# Patient Record
Sex: FEMALE | Race: WHITE | URBAN - METROPOLITAN AREA
[De-identification: names, ages, dates, MRNs, and addresses within clinical notes are randomized per-mention and may not be internally consistent; named-entity substitution may affect disease eponyms.]

---

## 2018-04-21 ENCOUNTER — INPATIENT (INPATIENT)
Facility: HOSPITAL | Age: 28
LOS: 1 days | Discharge: LEFT BEFORE DISCHARGE | End: 2018-04-23
Attending: INTERNAL MEDICINE | Admitting: INTERNAL MEDICINE
Payer: MEDICAID

## 2018-04-21 VITALS
TEMPERATURE: 99 F | HEIGHT: 64 IN | OXYGEN SATURATION: 100 % | HEART RATE: 114 BPM | WEIGHT: 125 LBS | DIASTOLIC BLOOD PRESSURE: 94 MMHG | SYSTOLIC BLOOD PRESSURE: 123 MMHG | RESPIRATION RATE: 18 BRPM

## 2018-04-21 DIAGNOSIS — F19.10 OTHER PSYCHOACTIVE SUBSTANCE ABUSE, UNCOMPLICATED: ICD-10-CM

## 2018-04-21 DIAGNOSIS — L03.114 CELLULITIS OF LEFT UPPER LIMB: ICD-10-CM

## 2018-04-21 DIAGNOSIS — Z29.9 ENCOUNTER FOR PROPHYLACTIC MEASURES, UNSPECIFIED: ICD-10-CM

## 2018-04-21 DIAGNOSIS — L03.113 CELLULITIS OF RIGHT UPPER LIMB: ICD-10-CM

## 2018-04-21 DIAGNOSIS — R01.1 CARDIAC MURMUR, UNSPECIFIED: ICD-10-CM

## 2018-04-21 LAB
ALBUMIN SERPL ELPH-MCNC: 3.1 G/DL — LOW (ref 3.3–5)
ALP SERPL-CCNC: 60 U/L — SIGNIFICANT CHANGE UP (ref 40–120)
ALT FLD-CCNC: 15 U/L — SIGNIFICANT CHANGE UP (ref 12–78)
ANION GAP SERPL CALC-SCNC: 9 MMOL/L — SIGNIFICANT CHANGE UP (ref 5–17)
AST SERPL-CCNC: 11 U/L — LOW (ref 15–37)
BASOPHILS # BLD AUTO: 0.02 K/UL — SIGNIFICANT CHANGE UP (ref 0–0.2)
BASOPHILS NFR BLD AUTO: 0.2 % — SIGNIFICANT CHANGE UP (ref 0–2)
BILIRUB SERPL-MCNC: 0.7 MG/DL — SIGNIFICANT CHANGE UP (ref 0.2–1.2)
BUN SERPL-MCNC: 10 MG/DL — SIGNIFICANT CHANGE UP (ref 7–23)
CALCIUM SERPL-MCNC: 8.6 MG/DL — SIGNIFICANT CHANGE UP (ref 8.5–10.1)
CHLORIDE SERPL-SCNC: 106 MMOL/L — SIGNIFICANT CHANGE UP (ref 96–108)
CO2 SERPL-SCNC: 25 MMOL/L — SIGNIFICANT CHANGE UP (ref 22–31)
CREAT SERPL-MCNC: 0.66 MG/DL — SIGNIFICANT CHANGE UP (ref 0.5–1.3)
EOSINOPHIL # BLD AUTO: 0.05 K/UL — SIGNIFICANT CHANGE UP (ref 0–0.5)
EOSINOPHIL NFR BLD AUTO: 0.4 % — SIGNIFICANT CHANGE UP (ref 0–6)
GLUCOSE SERPL-MCNC: 94 MG/DL — SIGNIFICANT CHANGE UP (ref 70–99)
HCG SERPL-ACNC: <1 MIU/ML — SIGNIFICANT CHANGE UP
HCT VFR BLD CALC: 35.9 % — SIGNIFICANT CHANGE UP (ref 34.5–45)
HGB BLD-MCNC: 11.3 G/DL — LOW (ref 11.5–15.5)
IMM GRANULOCYTES NFR BLD AUTO: 0.3 % — SIGNIFICANT CHANGE UP (ref 0–1.5)
LACTATE SERPL-SCNC: 0.6 MMOL/L — LOW (ref 0.7–2)
LYMPHOCYTES # BLD AUTO: 1.36 K/UL — SIGNIFICANT CHANGE UP (ref 1–3.3)
LYMPHOCYTES # BLD AUTO: 11.8 % — LOW (ref 13–44)
MCHC RBC-ENTMCNC: 26.2 PG — LOW (ref 27–34)
MCHC RBC-ENTMCNC: 31.5 GM/DL — LOW (ref 32–36)
MCV RBC AUTO: 83.1 FL — SIGNIFICANT CHANGE UP (ref 80–100)
MONOCYTES # BLD AUTO: 0.57 K/UL — SIGNIFICANT CHANGE UP (ref 0–0.9)
MONOCYTES NFR BLD AUTO: 5 % — SIGNIFICANT CHANGE UP (ref 2–14)
NEUTROPHILS # BLD AUTO: 9.44 K/UL — HIGH (ref 1.8–7.4)
NEUTROPHILS NFR BLD AUTO: 82.3 % — HIGH (ref 43–77)
NRBC # BLD: 0 /100 WBCS — SIGNIFICANT CHANGE UP (ref 0–0)
PLATELET # BLD AUTO: 319 K/UL — SIGNIFICANT CHANGE UP (ref 150–400)
POTASSIUM SERPL-MCNC: 3.6 MMOL/L — SIGNIFICANT CHANGE UP (ref 3.5–5.3)
POTASSIUM SERPL-SCNC: 3.6 MMOL/L — SIGNIFICANT CHANGE UP (ref 3.5–5.3)
PROT SERPL-MCNC: 7.2 GM/DL — SIGNIFICANT CHANGE UP (ref 6–8.3)
RBC # BLD: 4.32 M/UL — SIGNIFICANT CHANGE UP (ref 3.8–5.2)
RBC # FLD: 14 % — SIGNIFICANT CHANGE UP (ref 10.3–14.5)
SODIUM SERPL-SCNC: 140 MMOL/L — SIGNIFICANT CHANGE UP (ref 135–145)
WBC # BLD: 11.48 K/UL — HIGH (ref 3.8–10.5)
WBC # FLD AUTO: 11.48 K/UL — HIGH (ref 3.8–10.5)

## 2018-04-21 PROCEDURE — 99284 EMERGENCY DEPT VISIT MOD MDM: CPT

## 2018-04-21 PROCEDURE — 73130 X-RAY EXAM OF HAND: CPT | Mod: 26,LT

## 2018-04-21 PROCEDURE — 99223 1ST HOSP IP/OBS HIGH 75: CPT | Mod: AI

## 2018-04-21 RX ORDER — PIPERACILLIN AND TAZOBACTAM 4; .5 G/20ML; G/20ML
3.38 INJECTION, POWDER, LYOPHILIZED, FOR SOLUTION INTRAVENOUS EVERY 8 HOURS
Qty: 0 | Refills: 0 | Status: DISCONTINUED | OUTPATIENT
Start: 2018-04-22 | End: 2018-04-23

## 2018-04-21 RX ORDER — PIPERACILLIN AND TAZOBACTAM 4; .5 G/20ML; G/20ML
3.38 INJECTION, POWDER, LYOPHILIZED, FOR SOLUTION INTRAVENOUS ONCE
Qty: 0 | Refills: 0 | Status: DISCONTINUED | OUTPATIENT
Start: 2018-04-21 | End: 2018-04-21

## 2018-04-21 RX ORDER — KETOROLAC TROMETHAMINE 30 MG/ML
30 SYRINGE (ML) INJECTION ONCE
Qty: 0 | Refills: 0 | Status: DISCONTINUED | OUTPATIENT
Start: 2018-04-21 | End: 2018-04-21

## 2018-04-21 RX ORDER — MORPHINE SULFATE 50 MG/1
2 CAPSULE, EXTENDED RELEASE ORAL ONCE
Qty: 0 | Refills: 0 | Status: DISCONTINUED | OUTPATIENT
Start: 2018-04-21 | End: 2018-04-21

## 2018-04-21 RX ORDER — VANCOMYCIN HCL 1 G
1000 VIAL (EA) INTRAVENOUS EVERY 8 HOURS
Qty: 0 | Refills: 0 | Status: DISCONTINUED | OUTPATIENT
Start: 2018-04-22 | End: 2018-04-23

## 2018-04-21 RX ORDER — VANCOMYCIN HCL 1 G
1000 VIAL (EA) INTRAVENOUS ONCE
Qty: 0 | Refills: 0 | Status: COMPLETED | OUTPATIENT
Start: 2018-04-21 | End: 2018-04-21

## 2018-04-21 RX ORDER — SODIUM CHLORIDE 9 MG/ML
3 INJECTION INTRAMUSCULAR; INTRAVENOUS; SUBCUTANEOUS ONCE
Qty: 0 | Refills: 0 | Status: COMPLETED | OUTPATIENT
Start: 2018-04-21 | End: 2018-04-21

## 2018-04-21 RX ORDER — AMPICILLIN SODIUM AND SULBACTAM SODIUM 250; 125 MG/ML; MG/ML
3 INJECTION, POWDER, FOR SUSPENSION INTRAMUSCULAR; INTRAVENOUS ONCE
Qty: 0 | Refills: 0 | Status: COMPLETED | OUTPATIENT
Start: 2018-04-21 | End: 2018-04-21

## 2018-04-21 RX ADMIN — MORPHINE SULFATE 2 MILLIGRAM(S): 50 CAPSULE, EXTENDED RELEASE ORAL at 21:33

## 2018-04-21 RX ADMIN — Medication 30 MILLIGRAM(S): at 21:33

## 2018-04-21 RX ADMIN — AMPICILLIN SODIUM AND SULBACTAM SODIUM 200 GRAM(S): 250; 125 INJECTION, POWDER, FOR SUSPENSION INTRAMUSCULAR; INTRAVENOUS at 20:18

## 2018-04-21 RX ADMIN — Medication 250 MILLIGRAM(S): at 20:55

## 2018-04-21 RX ADMIN — SODIUM CHLORIDE 3 MILLILITER(S): 9 INJECTION INTRAMUSCULAR; INTRAVENOUS; SUBCUTANEOUS at 21:27

## 2018-04-21 RX ADMIN — MORPHINE SULFATE 2 MILLIGRAM(S): 50 CAPSULE, EXTENDED RELEASE ORAL at 21:18

## 2018-04-21 RX ADMIN — Medication 30 MILLIGRAM(S): at 21:18

## 2018-04-21 NOTE — ED PROVIDER NOTE - OBJECTIVE STATEMENT
27 year old female with h/o IVDA presents today c/o her left hand being swollen and tender, she admits to injecting heroin yesterday, last night she did notice a small area of swelling to the dorsal aspect of the hand between the first and second digit, she woke up this morning with her hand very swollen and tender (-) fevers +erythema +decreased range of motion

## 2018-04-21 NOTE — H&P ADULT - NSHPPHYSICALEXAM_GEN_ALL_CORE
Vital Signs Last 24 Hrs  T(C): 37 (21 Apr 2018 15:25), Max: 37 (21 Apr 2018 15:25)  T(F): 98.6 (21 Apr 2018 15:25), Max: 98.6 (21 Apr 2018 15:25)  HR: 114 (21 Apr 2018 15:25) (114 - 114)  BP: 123/94 (21 Apr 2018 15:25) (123/94 - 123/94)  BP(mean): --  RR: 18 (21 Apr 2018 15:25) (18 - 18)  SpO2: 100% (21 Apr 2018 15:25) (100% - 100%)    PHYSICAL EXAM:    GENERAL: NAD, well-groomed, well-developed  HEAD:  Atraumatic, Normocephalic  EYES: EOMI, PERRLA, conjunctiva and sclera clear  ENMT: No tonsillar erythema, exudates, or enlargement; Moist mucous membranes, No lesions  NECK: Supple, No JVD, Normal thyroid  NERVOUS SYSTEM:  Alert & Oriented X3, Good concentration; Motor Strength 5/5 B/L upper and lower extremities; DTRs 2+ intact and symmetric  CHEST/LUNG: Clear to percussion bilaterally; No rales, rhonchi, wheezing, or rubs  HEART: Regular rate and rhythm; +2/6 systolic murmur, No rubs, or gallops, +S1,S2  ABDOMEN: Soft, Nontender, Nondistended; Bowel sounds present  EXTREMITIES:  2+ Peripheral Pulses, No clubbing, cyanosis, or edema, marked swelling of left hand, +erythema and warmth and track marks, +track marks through out b/l ue, + abcess r-antecubital area ~1x3 cm,tender to touch, no fluctuance.  + eschars b/l thighs 5-6 mm each.  LYMPH: No cervical adenopathy  RECTAL: deferred  BREAST: No palpatble masses, skin no lesions   : deferred  SKIN: No rashes or lesions    IMPROVE VTE Individual Risk Assessment        RISK                                                          Points  [  ] Previous VTE                                                3  [  ] Thrombophilia                                             2  [  ] Lower limb paralysis                                    2        (unable to hold up >15 seconds)    [  ] Current Cancer                                             2         (within 6 months)  [  ] Immobilization > 24 hrs                              1  [  ] ICU/CCU stay > 24 hours                            1  [  ] Age > 60                                                    1  IMPROVE VTE Score ____0_____

## 2018-04-21 NOTE — ED ADULT NURSE NOTE - CHPI ED SYMPTOMS NEG
no numbness/no back pain/no deformity/no weakness/no difficulty bearing weight/no abrasion/no tingling/no bruising/no fever

## 2018-04-21 NOTE — ED ADULT NURSE NOTE - OBJECTIVE STATEMENT
Patient presents today with complaints of left hand swelling that started about three days ago. Denies fever, chills. Patient is alert and oriented x 4, respirations are even and unlabored, S1, S2 regular, left Patient presents today with complaints of left hand swelling that started about three days ago. Denies fever, chills. Patient is alert and oriented x 4, respirations are even and unlabored, S1, S2 regular, left hand range of motion limited due to pain, right elbow swollen , erythematous, no drainage, no odor, warm to touch. 20 gauge saline lock placed on right metacarpal. Blood drawn and sent, including blood cultures. Will follow up.

## 2018-04-21 NOTE — H&P ADULT - HISTORY OF PRESENT ILLNESS
Pt is a 26 y/o female w/hx of ivdu per pt for 2 yrs heroin and cocaine qd, last use yesterday.  Pt states she had noticed boil on the back of left hand between first and second digit 3 days ago, and then some swelling of hand but states over night just blew up with marked pain along with swelling and decided to seek care. pt denies any fever chills, sob, cp, palpitations, n/v/d/c no such sx's in past. on looking  at pt's track marks all over b/l UE, she has abcess on r-medial aspect of antecubital area which she states also for about the same, period.  pt hesitant for full body skin exam, but denies injecting other parts of body.  states doesnt share needles and uses clean needles. doesnt know of hiv or hepc status.

## 2018-04-21 NOTE — ED ADULT NURSE NOTE - ED STAT RN HANDOFF DETAILS
Transfer to unit on stretcher accompanied by ED tech and friend. IV is in place. Report given to Cecile on unit.

## 2018-04-21 NOTE — H&P ADULT - NSHPLABSRESULTS_GEN_ALL_CORE
LABS:                        11.3   11.48 )-----------( 319      ( 21 Apr 2018 20:08 )             35.9     04-21    140  |  106  |  10  ----------------------------<  94  3.6   |  25  |  0.66    Ca    8.6      21 Apr 2018 20:08    TPro  7.2  /  Alb  3.1<L>  /  TBili  0.7  /  DBili  x   /  AST  11<L>  /  ALT  15  /  AlkPhos  60  04-21        CAPILLARY BLOOD GLUCOSE          RADIOLOGY & ADDITIONAL TESTS:    Imaging Personally Reviewed:  [ X] YES  [ ] NO

## 2018-04-22 LAB
ANION GAP SERPL CALC-SCNC: 7 MMOL/L — SIGNIFICANT CHANGE UP (ref 5–17)
BUN SERPL-MCNC: 11 MG/DL — SIGNIFICANT CHANGE UP (ref 7–23)
CALCIUM SERPL-MCNC: 8.2 MG/DL — LOW (ref 8.5–10.1)
CHLORIDE SERPL-SCNC: 107 MMOL/L — SIGNIFICANT CHANGE UP (ref 96–108)
CO2 SERPL-SCNC: 27 MMOL/L — SIGNIFICANT CHANGE UP (ref 22–31)
CREAT SERPL-MCNC: 0.72 MG/DL — SIGNIFICANT CHANGE UP (ref 0.5–1.3)
GLUCOSE SERPL-MCNC: 143 MG/DL — HIGH (ref 70–99)
HCT VFR BLD CALC: 35.4 % — SIGNIFICANT CHANGE UP (ref 34.5–45)
HGB BLD-MCNC: 11.3 G/DL — LOW (ref 11.5–15.5)
MCHC RBC-ENTMCNC: 26.7 PG — LOW (ref 27–34)
MCHC RBC-ENTMCNC: 31.9 GM/DL — LOW (ref 32–36)
MCV RBC AUTO: 83.5 FL — SIGNIFICANT CHANGE UP (ref 80–100)
NRBC # BLD: 0 /100 WBCS — SIGNIFICANT CHANGE UP (ref 0–0)
PLATELET # BLD AUTO: 304 K/UL — SIGNIFICANT CHANGE UP (ref 150–400)
POTASSIUM SERPL-MCNC: 3.3 MMOL/L — LOW (ref 3.5–5.3)
POTASSIUM SERPL-SCNC: 3.3 MMOL/L — LOW (ref 3.5–5.3)
RBC # BLD: 4.24 M/UL — SIGNIFICANT CHANGE UP (ref 3.8–5.2)
RBC # FLD: 14.2 % — SIGNIFICANT CHANGE UP (ref 10.3–14.5)
SODIUM SERPL-SCNC: 141 MMOL/L — SIGNIFICANT CHANGE UP (ref 135–145)
VANCOMYCIN TROUGH SERPL-MCNC: 13.6 UG/ML — SIGNIFICANT CHANGE UP (ref 10–20)
VANCOMYCIN TROUGH SERPL-MCNC: 23 UG/ML — HIGH (ref 10–20)
WBC # BLD: 9.86 K/UL — SIGNIFICANT CHANGE UP (ref 3.8–10.5)
WBC # FLD AUTO: 9.86 K/UL — SIGNIFICANT CHANGE UP (ref 3.8–10.5)

## 2018-04-22 PROCEDURE — 99233 SBSQ HOSP IP/OBS HIGH 50: CPT

## 2018-04-22 PROCEDURE — 73200 CT UPPER EXTREMITY W/O DYE: CPT | Mod: 26,LT

## 2018-04-22 PROCEDURE — 93306 TTE W/DOPPLER COMPLETE: CPT | Mod: 26

## 2018-04-22 RX ORDER — POTASSIUM CHLORIDE 20 MEQ
40 PACKET (EA) ORAL ONCE
Qty: 0 | Refills: 0 | Status: COMPLETED | OUTPATIENT
Start: 2018-04-22 | End: 2018-04-22

## 2018-04-22 RX ORDER — KETOROLAC TROMETHAMINE 30 MG/ML
15 SYRINGE (ML) INJECTION EVERY 6 HOURS
Qty: 0 | Refills: 0 | Status: DISCONTINUED | OUTPATIENT
Start: 2018-04-22 | End: 2018-04-23

## 2018-04-22 RX ORDER — OXYCODONE AND ACETAMINOPHEN 5; 325 MG/1; MG/1
1 TABLET ORAL ONCE
Qty: 0 | Refills: 0 | Status: DISCONTINUED | OUTPATIENT
Start: 2018-04-22 | End: 2018-04-22

## 2018-04-22 RX ORDER — ACETAMINOPHEN 500 MG
650 TABLET ORAL EVERY 6 HOURS
Qty: 0 | Refills: 0 | Status: DISCONTINUED | OUTPATIENT
Start: 2018-04-22 | End: 2018-04-22

## 2018-04-22 RX ORDER — MORPHINE SULFATE 50 MG/1
2 CAPSULE, EXTENDED RELEASE ORAL EVERY 4 HOURS
Qty: 0 | Refills: 0 | Status: DISCONTINUED | OUTPATIENT
Start: 2018-04-22 | End: 2018-04-23

## 2018-04-22 RX ADMIN — MORPHINE SULFATE 2 MILLIGRAM(S): 50 CAPSULE, EXTENDED RELEASE ORAL at 20:49

## 2018-04-22 RX ADMIN — Medication 250 MILLIGRAM(S): at 20:46

## 2018-04-22 RX ADMIN — Medication 250 MILLIGRAM(S): at 04:36

## 2018-04-22 RX ADMIN — Medication 650 MILLIGRAM(S): at 07:21

## 2018-04-22 RX ADMIN — MORPHINE SULFATE 2 MILLIGRAM(S): 50 CAPSULE, EXTENDED RELEASE ORAL at 20:29

## 2018-04-22 RX ADMIN — PIPERACILLIN AND TAZOBACTAM 12.5 GRAM(S): 4; .5 INJECTION, POWDER, LYOPHILIZED, FOR SOLUTION INTRAVENOUS at 22:26

## 2018-04-22 RX ADMIN — PIPERACILLIN AND TAZOBACTAM 12.5 GRAM(S): 4; .5 INJECTION, POWDER, LYOPHILIZED, FOR SOLUTION INTRAVENOUS at 05:50

## 2018-04-22 RX ADMIN — MORPHINE SULFATE 2 MILLIGRAM(S): 50 CAPSULE, EXTENDED RELEASE ORAL at 15:49

## 2018-04-22 RX ADMIN — MORPHINE SULFATE 2 MILLIGRAM(S): 50 CAPSULE, EXTENDED RELEASE ORAL at 16:05

## 2018-04-22 RX ADMIN — PIPERACILLIN AND TAZOBACTAM 12.5 GRAM(S): 4; .5 INJECTION, POWDER, LYOPHILIZED, FOR SOLUTION INTRAVENOUS at 14:11

## 2018-04-22 RX ADMIN — OXYCODONE AND ACETAMINOPHEN 1 TABLET(S): 5; 325 TABLET ORAL at 14:25

## 2018-04-22 RX ADMIN — Medication 15 MILLIGRAM(S): at 18:32

## 2018-04-22 RX ADMIN — Medication 650 MILLIGRAM(S): at 08:21

## 2018-04-22 RX ADMIN — OXYCODONE AND ACETAMINOPHEN 1 TABLET(S): 5; 325 TABLET ORAL at 13:25

## 2018-04-22 RX ADMIN — Medication 250 MILLIGRAM(S): at 12:26

## 2018-04-22 RX ADMIN — Medication 40 MILLIEQUIVALENT(S): at 12:25

## 2018-04-22 NOTE — PROGRESS NOTE ADULT - PROBLEM SELECTOR PLAN 1
admit to medicine, blood cultures, IV Vancomycin 1,000 mg  every 8 hours and IV Zosyn 3.375 g every 8 hours. Hand surg consult  and ID conuslt called. Admit to medicine, blood cultures, IV Vancomycin 1,000 mg  every 8 hours and IV Zosyn 3.375 g every 8 hours. Hand surg consult  and ID conuslt called. Vanco trough today after third dose.

## 2018-04-22 NOTE — PROGRESS NOTE ADULT - SUBJECTIVE AND OBJECTIVE BOX
INTERVAL HPI/OVERNIGHT EVENTS:  Pt seen and examined at bedside.     Allergies/Intolerance: No Known Allergies      MEDICATIONS  (STANDING):  piperacillin/tazobactam IVPB. 3.375 Gram(s) IV Intermittent every 8 hours  vancomycin  IVPB 1000 milliGRAM(s) IV Intermittent every 8 hours    MEDICATIONS  (PRN):  acetaminophen   Tablet. 650 milliGRAM(s) Oral every 6 hours PRN Mild Pain (1 - 3)        ROS: all systems reviewed and wnl      PHYSICAL EXAMINATION:  Vital Signs Last 24 Hrs  T(C): 37.1 (22 Apr 2018 11:45), Max: 37.1 (22 Apr 2018 00:12)  T(F): 98.8 (22 Apr 2018 11:45), Max: 98.8 (22 Apr 2018 05:42)  HR: 74 (22 Apr 2018 11:45) (69 - 114)  BP: 116/67 (22 Apr 2018 11:45) (100/57 - 123/94)  BP(mean): --  RR: 16 (22 Apr 2018 11:45) (16 - 18)  SpO2: 94% (22 Apr 2018 11:45) (94% - 100%)  CAPILLARY BLOOD GLUCOSE            GENERAL:   NECK: supple, No JVD  CHEST/LUNG: clear to auscultation bilaterally; no rales, rhonchi, or wheezing b/l  HEART: normal S1, S2  ABDOMEN: BS+, soft, ND, NT   EXTREMITIES:  pulses palpable; no clubbing, cyanosis, or edema b/l LEs  SKIN: no rashes or lesions      LABS:                        11.3   9.86  )-----------( 304      ( 22 Apr 2018 06:56 )             35.4     04-22    141  |  107  |  11  ----------------------------<  143<H>  3.3<L>   |  27  |  0.72    Ca    8.2<L>      22 Apr 2018 06:56    TPro  7.2  /  Alb  3.1<L>  /  TBili  0.7  /  DBili  x   /  AST  11<L>  /  ALT  15  /  AlkPhos  60  04-21 INTERVAL HPI/OVERNIGHT EVENTS:  Pt seen and examined at bedside.     Allergies/Intolerance: No Known Allergies      MEDICATIONS  (STANDING):  piperacillin/tazobactam IVPB. 3.375 Gram(s) IV Intermittent every 8 hours  vancomycin  IVPB 1000 milliGRAM(s) IV Intermittent every 8 hours    MEDICATIONS  (PRN):  acetaminophen   Tablet. 650 milliGRAM(s) Oral every 6 hours PRN Mild Pain (1 - 3)        ROS: all systems reviewed and wnl      PHYSICAL EXAMINATION:  Vital Signs Last 24 Hrs  T(C): 37.1 (22 Apr 2018 11:45), Max: 37.1 (22 Apr 2018 00:12)  T(F): 98.8 (22 Apr 2018 11:45), Max: 98.8 (22 Apr 2018 05:42)  HR: 74 (22 Apr 2018 11:45) (69 - 114)  BP: 116/67 (22 Apr 2018 11:45) (100/57 - 123/94)  BP(mean): --  RR: 16 (22 Apr 2018 11:45) (16 - 18)  SpO2: 94% (22 Apr 2018 11:45) (94% - 100%)  CAPILLARY BLOOD GLUCOSE            GENERAL: afebrile, left hand still swollen and painful, tender, can move wrist and digits with minimal pain    NECK: supple, No JVD  CHEST/LUNG: clear to auscultation bilaterally; no rales, rhonchi, or wheezing b/l  HEART: normal S1, S2  ABDOMEN: BS+, soft, ND, NT   EXTREMITIES:  swollen left hand, track marks on left forearm  SKIN: no rashes or lesions      LABS:                        11.3   9.86  )-----------( 304      ( 22 Apr 2018 06:56 )             35.4     04-22    141  |  107  |  11  ----------------------------<  143<H>  3.3<L>   |  27  |  0.72    Ca    8.2<L>      22 Apr 2018 06:56    TPro  7.2  /  Alb  3.1<L>  /  TBili  0.7  /  DBili  x   /  AST  11<L>  /  ALT  15  /  AlkPhos  60  04-21

## 2018-04-22 NOTE — CONSULT NOTE ADULT - SUBJECTIVE AND OBJECTIVE BOX
see dictated note  Pt very combative and uncooperative with hx and exam.  Imp: Left hand cellulitis, R/O deep abscess.  Rec: Cont iv abx/ID consult  May ultimately require drainage as in- or out-pt if no definitive complete improvement after a course of abx given (pt reluctant and not agreeable to have any type of procedure done at this time.  I will be available for further eval and please call me any time.

## 2018-04-22 NOTE — PROGRESS NOTE ADULT - PROBLEM SELECTOR PLAN 2
cellulitis and abcess of r-arm, doesn't seem drainable yet.  as above. X-ray negative for acute OM. cellulitis and abcess of r-arm, doesn't seem drainable yet.  as above. X-ray negative for acute OM. Keep left hand elevated.   CT non-contrast left hand ordered to r/o drainable collection. Has metal   in neck so cannot do MRI.

## 2018-04-23 VITALS
SYSTOLIC BLOOD PRESSURE: 102 MMHG | RESPIRATION RATE: 17 BRPM | OXYGEN SATURATION: 97 % | DIASTOLIC BLOOD PRESSURE: 70 MMHG | HEART RATE: 57 BPM

## 2018-04-23 PROCEDURE — 99221 1ST HOSP IP/OBS SF/LOW 40: CPT

## 2018-04-23 PROCEDURE — 90792 PSYCH DIAG EVAL W/MED SRVCS: CPT

## 2018-04-23 PROCEDURE — 99233 SBSQ HOSP IP/OBS HIGH 50: CPT

## 2018-04-23 RX ORDER — LOPERAMIDE HCL 2 MG
2 TABLET ORAL EVERY 4 HOURS
Qty: 0 | Refills: 0 | Status: DISCONTINUED | OUTPATIENT
Start: 2018-04-23 | End: 2018-04-23

## 2018-04-23 RX ORDER — ONDANSETRON 8 MG/1
4 TABLET, FILM COATED ORAL ONCE
Qty: 0 | Refills: 0 | Status: COMPLETED | OUTPATIENT
Start: 2018-04-23 | End: 2018-04-23

## 2018-04-23 RX ORDER — METOCLOPRAMIDE HCL 10 MG
10 TABLET ORAL EVERY 8 HOURS
Qty: 0 | Refills: 0 | Status: COMPLETED | OUTPATIENT
Start: 2018-04-23 | End: 2018-04-23

## 2018-04-23 RX ORDER — DEXTROSE MONOHYDRATE, SODIUM CHLORIDE, AND POTASSIUM CHLORIDE 50; .745; 4.5 G/1000ML; G/1000ML; G/1000ML
1000 INJECTION, SOLUTION INTRAVENOUS
Qty: 0 | Refills: 0 | Status: DISCONTINUED | OUTPATIENT
Start: 2018-04-23 | End: 2018-04-23

## 2018-04-23 RX ORDER — HYDROXYZINE HCL 10 MG
50 TABLET ORAL EVERY 4 HOURS
Qty: 0 | Refills: 0 | Status: DISCONTINUED | OUTPATIENT
Start: 2018-04-23 | End: 2018-04-23

## 2018-04-23 RX ADMIN — Medication 250 MILLIGRAM(S): at 11:41

## 2018-04-23 RX ADMIN — DEXTROSE MONOHYDRATE, SODIUM CHLORIDE, AND POTASSIUM CHLORIDE 100 MILLILITER(S): 50; .745; 4.5 INJECTION, SOLUTION INTRAVENOUS at 14:55

## 2018-04-23 RX ADMIN — Medication 15 MILLIGRAM(S): at 13:20

## 2018-04-23 RX ADMIN — Medication 250 MILLIGRAM(S): at 06:09

## 2018-04-23 RX ADMIN — Medication 15 MILLIGRAM(S): at 17:25

## 2018-04-23 RX ADMIN — MORPHINE SULFATE 2 MILLIGRAM(S): 50 CAPSULE, EXTENDED RELEASE ORAL at 15:26

## 2018-04-23 RX ADMIN — MORPHINE SULFATE 2 MILLIGRAM(S): 50 CAPSULE, EXTENDED RELEASE ORAL at 10:44

## 2018-04-23 RX ADMIN — Medication 1 MILLIGRAM(S): at 13:01

## 2018-04-23 RX ADMIN — PIPERACILLIN AND TAZOBACTAM 12.5 GRAM(S): 4; .5 INJECTION, POWDER, LYOPHILIZED, FOR SOLUTION INTRAVENOUS at 14:17

## 2018-04-23 RX ADMIN — Medication 15 MILLIGRAM(S): at 07:08

## 2018-04-23 RX ADMIN — MORPHINE SULFATE 2 MILLIGRAM(S): 50 CAPSULE, EXTENDED RELEASE ORAL at 11:00

## 2018-04-23 RX ADMIN — Medication 250 MILLIGRAM(S): at 20:29

## 2018-04-23 RX ADMIN — Medication 15 MILLIGRAM(S): at 06:08

## 2018-04-23 RX ADMIN — Medication 15 MILLIGRAM(S): at 13:04

## 2018-04-23 RX ADMIN — Medication 15 MILLIGRAM(S): at 17:40

## 2018-04-23 RX ADMIN — MORPHINE SULFATE 2 MILLIGRAM(S): 50 CAPSULE, EXTENDED RELEASE ORAL at 06:08

## 2018-04-23 RX ADMIN — MORPHINE SULFATE 2 MILLIGRAM(S): 50 CAPSULE, EXTENDED RELEASE ORAL at 15:11

## 2018-04-23 RX ADMIN — ONDANSETRON 4 MILLIGRAM(S): 8 TABLET, FILM COATED ORAL at 06:56

## 2018-04-23 RX ADMIN — PIPERACILLIN AND TAZOBACTAM 12.5 GRAM(S): 4; .5 INJECTION, POWDER, LYOPHILIZED, FOR SOLUTION INTRAVENOUS at 06:10

## 2018-04-23 RX ADMIN — Medication 10 MILLIGRAM(S): at 15:12

## 2018-04-23 RX ADMIN — MORPHINE SULFATE 2 MILLIGRAM(S): 50 CAPSULE, EXTENDED RELEASE ORAL at 07:08

## 2018-04-23 RX ADMIN — Medication 15 MILLIGRAM(S): at 00:20

## 2018-04-23 RX ADMIN — Medication 15 MILLIGRAM(S): at 00:51

## 2018-04-23 NOTE — BEHAVIORAL HEALTH ASSESSMENT NOTE - RISK ASSESSMENT
has chronic risks: substance abuse, possible substance induced mood disorder, lack of support, likely homeless, anxious, withdrawal distress.  But denies any si or hi, no psychosis, is future oriented, able to advocate for her needs.

## 2018-04-23 NOTE — BEHAVIORAL HEALTH ASSESSMENT NOTE - OTHER
likely homeless has a bf substance abuse but uncooperative due to distress reasonable variable unable to assess restless help me I am withdrawing refuses mmse at this point. fair

## 2018-04-23 NOTE — CHART NOTE - NSCHARTNOTEFT_GEN_A_CORE
Medicine Hospitalist PA    Follow up with Plastic Surgery Physician 1-2 days.  FOllow up with PMD 1-2 days.    Dr. Neo Emanuel    Plastic surgery    [address] [place] [address]    [phone]

## 2018-04-23 NOTE — BEHAVIORAL HEALTH ASSESSMENT NOTE - NSBHCONSULTMEDS_PSY_A_CORE FT
Recommendations.  1. EO for added supervision. Does not require 1:1  2. CINA protocol  3. PRN meds for opioid withdrawal has been ordered.  3. Patient is currently sedated from Ativan 1mg IV that was given earlier- unable to fully assess.  4. Opioid withdrawal symptoms: Would benefit from Methadone taper 20mg PO, and then reduce by 5mg per day (first day: 20mg, second day: 15mg, third day: 10mg, fourth day: 5mg, and then  STOP)  5. Monitor vitals (hold for rr<12, sedation) for methadone. Also check qtc to ensure less than 500 before starting Methadone.  6. Do not give benzodiazepine with Methadone.   7. For anxiety/agitation: Seroquel 50mg TID PRN PO.  8. SW consult: would benefit from detox- inpatient substance abuse rehab admission.

## 2018-04-23 NOTE — PROGRESS NOTE ADULT - PROBLEM SELECTOR PLAN 1
Admit to medicine, blood cultures, IV Vancomycin 1,000 mg  every 8 hours and IV Zosyn 3.375 g every 8 hours. Hand surg consult  and ID conuslt called. Vanco trough today after third dose. Admit to medicine, blood cultures, IV Vancomycin 1,000 mg  every 8 hours and IV Zosyn 3.375 g every 8 hours. Hand surg consult reviewed, no surgical plans for debridement at present. ID to see patient today. Vanco trough today after third dose. Overall left hand 50 % less swollen, less tender.

## 2018-04-23 NOTE — CONSULT NOTE ADULT - SUBJECTIVE AND OBJECTIVE BOX
Infectious Diseases - Attending at Dr. Fry    HPI:  Pt is a 26 y/o female w/hx of ivdu per pt for 2 yrs heroin and cocaine qd, last use yesterday.  Pt states she had noticed boil on the back of left hand between first and second digit 3 days ago, and then some swelling of hand but states over night just blew up with marked pain along with swelling and decided to seek care. pt denies any fever chills, sob, cp, palpitations, n/v/d/c no such sx's in past. on looking  at pt's track marks all over b/l UE, she has abcess on r-medial aspect of antecubital area which she states also for about the same, period.  pt hesitant for full body skin exam, but denies injecting other parts of body.  states doesnt share needles and uses clean needles. doesnt know of hiv or hepc status. (21 Apr 2018 22:49)  still has right swelling with pain and Restricted ROM of thumb and index finger .omn vanco and zosyn    PAST MEDICAL & SURGICAL HISTORY:  Drug abuse  No significant past surgical history      Allergies    No Known Allergies    Intolerances        FAMILY HISTORY:  No pertinent family history in first degree relatives      SOCIAL HISTORY:    REVIEW OF SYSTEMS:    Constitutional: No fever, weight loss or fatigue  Eyes: No eye pain, visual disturbances, or discharge  ENT:  No difficulty hearing, tinnitus, vertigo; No sinus or throat pain  Neck: No pain or stiffness  Respiratory: No cough, wheezing, chills or hemoptysis  Cardiovascular: No chest pain, palpitations, shortness of breath, dizziness or leg swelling  Gastrointestinal: No abdominal or epigastric pain. No nausea, vomiting or hematemesis; No diarrhea or constipation. No melena or hematochezia.  Genitourinary: No dysuria, frequency, hematuria or incontinence  Neurological: No headaches, memory loss, loss of strength, numbness or tremors  Skin: No itching, burning, rashes or lesions       MEDICATIONS  (STANDING):  dextrose 5% + sodium chloride 0.45% with potassium chloride 20 mEq/L 1000 milliLiter(s) (100 mL/Hr) IV Continuous <Continuous>  ketorolac   Injectable 15 milliGRAM(s) IV Push every 6 hours  piperacillin/tazobactam IVPB. 3.375 Gram(s) IV Intermittent every 8 hours  vancomycin  IVPB 1000 milliGRAM(s) IV Intermittent every 8 hours    MEDICATIONS  (PRN):  cloNIDine 0.1 milliGRAM(s) Oral every 4 hours PRN Withdrawal symptoms  dicyclomine 10 milliGRAM(s) Oral three times a day before meals PRN abdominal pain  loperamide 2 milliGRAM(s) Oral every 4 hours PRN After each loose stool as needed for diarrhea  morphine  - Injectable 2 milliGRAM(s) IV Push every 4 hours PRN Mild Pain (1 - 3)      Vital Signs Last 24 Hrs  T(C): 36.8 (23 Apr 2018 18:05), Max: 37.5 (23 Apr 2018 17:55)  T(F): 98.3 (23 Apr 2018 18:05), Max: 99.5 (23 Apr 2018 17:55)  HR: 52 (23 Apr 2018 18:05) (52 - 64)  BP: 122/71 (23 Apr 2018 18:05) (111/69 - 122/71)  BP(mean): --  RR: 16 (23 Apr 2018 18:05) (16 - 18)  SpO2: 96% (23 Apr 2018 18:05) (96% - 100%)    PHYSICAL EXAM:    Constitutional: NAD, well-groomed, well-developed  HEENT: PERRLA, EOMI, Normal Hearing, MMM  Neck: No LAD, No JVD  Back: Normal spine flexure, No CVA tenderness  Respiratory: CTAB/L  Cardiovascular: S1 and S2, RRR, no M/G/R  Gastrointestinal: BS+, soft, NT/ND  Extremities: No peripheral edema  Vascular: 2+ peripheral pulses  Neurological: A/O x 3, no focal deficits  Skin: No rashes      LABS:                        11.3   9.86  )-----------( 304      ( 22 Apr 2018 06:56 )             35.4     04-22    141  |  107  |  11  ----------------------------<  143<H>  3.3<L>   |  27  |  0.72    Ca    8.2<L>      22 Apr 2018 06:56            MICROBIOLOGY:  RECENT CULTURES:  04-22 .Blood Blood XXXX XXXX   No growth to date.          RADIOLOGY & ADDITIONAL STUDIES: Infectious Diseases - Attending at Dr. Fry    HPI:  Pt is a 26 y/o female w/hx of ivdu per pt for 2 yrs heroin and cocaine qd, last use yesterday.  Pt states she had noticed boil on the back of left hand between first and second digit 3 days ago, and then some swelling of hand but states over night just blew up with marked pain along with swelling and decided to seek care. pt denies any fever chills, sob, cp, palpitations, n/v/d/c no such sx's in past. on looking  at pt's track marks all over b/l UE, she has abcess on r-medial aspect of antecubital area which she states also for about the same, period.  pt hesitant for full body skin exam, but denies injecting other parts of body.  states doesnt share needles and uses clean needles. doesnt know of hiv or hepc status. (21 Apr 2018 22:49)  still has right swelling with pain and Restricted ROM of thumb and index finger .on vanco and zosyn.    PAST MEDICAL & SURGICAL HISTORY:  Drug abuse  No significant past surgical history      Allergies    No Known Allergies    Intolerances        FAMILY HISTORY:  No pertinent family history in first degree relatives      SOCIAL HISTORY: IVDU     REVIEW OF SYSTEMS:    Constitutional: No fever, weight loss or fatigue  Eyes: No eye pain, visual disturbances, or discharge  ENT:  No difficulty hearing, tinnitus, vertigo; No sinus or throat pain  Neck: No pain or stiffness  Respiratory: No cough, wheezing, chills or hemoptysis  Cardiovascular: No chest pain, palpitations, shortness of breath, dizziness or leg swelling  Gastrointestinal: No abdominal or epigastric pain. No nausea, vomiting or hematemesis; No diarrhea or constipation. No melena or hematochezia.  Genitourinary: No dysuria, frequency, hematuria or incontinence  Neurological: No headaches, memory loss, loss of strength, numbness or tremors  Skin: right hand swelling       MEDICATIONS  (STANDING):  dextrose 5% + sodium chloride 0.45% with potassium chloride 20 mEq/L 1000 milliLiter(s) (100 mL/Hr) IV Continuous <Continuous>  ketorolac   Injectable 15 milliGRAM(s) IV Push every 6 hours  piperacillin/tazobactam IVPB. 3.375 Gram(s) IV Intermittent every 8 hours  vancomycin  IVPB 1000 milliGRAM(s) IV Intermittent every 8 hours    MEDICATIONS  (PRN):  cloNIDine 0.1 milliGRAM(s) Oral every 4 hours PRN Withdrawal symptoms  dicyclomine 10 milliGRAM(s) Oral three times a day before meals PRN abdominal pain  loperamide 2 milliGRAM(s) Oral every 4 hours PRN After each loose stool as needed for diarrhea  morphine  - Injectable 2 milliGRAM(s) IV Push every 4 hours PRN Mild Pain (1 - 3)      Vital Signs Last 24 Hrs  T(C): 36.8 (23 Apr 2018 18:05), Max: 37.5 (23 Apr 2018 17:55)  T(F): 98.3 (23 Apr 2018 18:05), Max: 99.5 (23 Apr 2018 17:55)  HR: 52 (23 Apr 2018 18:05) (52 - 64)  BP: 122/71 (23 Apr 2018 18:05) (111/69 - 122/71)  BP(mean): --  RR: 16 (23 Apr 2018 18:05) (16 - 18)  SpO2: 96% (23 Apr 2018 18:05) (96% - 100%)    PHYSICAL EXAM:    Constitutional: NAD, well-groomed, well-developed  HEENT: PERRLA, EOMI, Normal Hearing, MMM  Neck: No LAD, No JVD  Back: Normal spine flexure, No CVA tenderness  Respiratory: CTAB/L  Cardiovascular: S1 and S2, RRR, no M/G/R  Gastrointestinal: BS+, soft, NT/ND  Extremities: No peripheral edema  Vascular: 2+ peripheral pulses  Neurological: A/O x 3, no focal deficits  Skin: right hand swelling more prominent between thumb and index finger, tender + erythema and warmth + ROM on thumb and index finger restricted       LABS:                        11.3   9.86  )-----------( 304      ( 22 Apr 2018 06:56 )             35.4     04-22    141  |  107  |  11  ----------------------------<  143<H>  3.3<L>   |  27  |  0.72    Ca    8.2<L>      22 Apr 2018 06:56            MICROBIOLOGY:  RECENT CULTURES:  04-22 .Blood Blood XXXX XXXX   No growth to date.          RADIOLOGY & ADDITIONAL STUDIES:

## 2018-04-23 NOTE — BEHAVIORAL HEALTH ASSESSMENT NOTE - HPI (INCLUDE ILLNESS QUALITY, SEVERITY, DURATION, TIMING, CONTEXT, MODIFYING FACTORS, ASSOCIATED SIGNS AND SYMPTOMS)
Briefly the patient is a 27 year old woman, single, unemployed, likely homeless, has no pertinent medical issues, has a chronic psychiatric history significant for Opioid dependence, Cocaine abuse, possible substance induced mood disorder, no known history of inpatient psychiatric admissions, denies any history of si or hi, unknown legal history, presented to the ed with swelling+ redness of her hand. Admitted for cellulitis.   Over the 24 hours of her admission, patient was restless, agitated, and visibly experiencing withdrawal symptoms- opioid. Consult was placed on 4/23/2018 for opioid withdrawal treatment recommendations.   Met with the patient. Agitated, anxious, restless, sensation of 'jumping out of skin', nauseous, vomiting. Not aggressive. Pleading with staff to 'help me'. She briefly denies any si or hi when asked.   Discussed with patient the plan to start Methadone. Received Ativan 1mg IV prior and as interview progressed, she becomes sleepy, and falls sound asleep.

## 2018-04-23 NOTE — CHART NOTE - NSCHARTNOTEFT_GEN_A_CORE
Met with the patient. Reviewed psychees. Reviewed Istop. Discussed case with Dr Augustine. Elaborate note to follow.    Met with the patient. Restless, anxious, notable signs of opioid withdrawal. Patient admits to using signficant amounts of IV heroin. Distress prevented her from engaging in discussion about amount, frequency, and if any other substances.     Recommendations.  1. EO for added supervision  2. CINA protocol  3. PRN meds for opioid withdrawal has been ordered.  3. Patient is currently sedated from Ativan 1mg IV that was given earlier- unable to fully assess.  4. Opioid withdrawal symptoms: Would benefit from Methadone taper 20mg PO, and then reduce by 5mg per day (first day: 20mg, second day: 15mg, third day: 10mg, fourth day: 5mg, and then  STOP)  5. Monitor vitals (hold for rr<12, sedation) for methadone. Also check qtc to ensure less than 500 before starting Methadone.  6. Do not give benzodiazepine with Methadone.   7. For anxiety/agitation: Seroquel 50mg TID PRN PO.    Will follow up tomorrow am.     Lupe Ugalde MD  Attending psychiatrist Met with the patient. Reviewed psychees. Reviewed Istop. Discussed case with Dr Augustine. Elaborate note to follow.    Met with the patient. Restless, anxious, notable signs of opioid withdrawal. Patient admits to using signficant amounts of IV heroin. Distress prevented her from engaging in discussion about amount, frequency, and if any other substances. She denies any mood symptoms, denies any si or hi. Keen on detox.     Recommendations.  1. EO for added supervision. Does not require 1:1  2. CINA protocol  3. PRN meds for opioid withdrawal has been ordered.  3. Patient is currently sedated from Ativan 1mg IV that was given earlier- unable to fully assess.  4. Opioid withdrawal symptoms: Would benefit from Methadone taper 20mg PO, and then reduce by 5mg per day (first day: 20mg, second day: 15mg, third day: 10mg, fourth day: 5mg, and then  STOP)  5. Monitor vitals (hold for rr<12, sedation) for methadone. Also check qtc to ensure less than 500 before starting Methadone.  6. Do not give benzodiazepine with Methadone.   7. For anxiety/agitation: Seroquel 50mg TID PRN PO.  8. SW consult: would benefit from detox- inpatient substance abuse rehab admission.    Will follow up     Lupe Ugalde MD  Attending psychiatrist

## 2018-04-23 NOTE — CHART NOTE - NSCHARTNOTEFT_GEN_A_CORE
Medicine Hospitalist PA    Pt leaving AMA. Explained to pt the risks of leaving against medical advice. Pt was receiving IV abx for cellulitis. Many attempts have been made to reason with pt to remain in hospital however pt refusing to cooperate and wants to leave AMA. Pt aware of consequences of leaving against medical advice including harm, injury, loss of function, sepsis or death. Pt fully understands, all question have been answered. Explained to pt the importance of Following up with the following physicians Dr. E[facility]. Pt agrees to comply. Pt signed AMA form, witnessed by RN at . D/w Dr. Jurado. Medicine Hospitalist PA    Pt leaving AMA. Explained to pt the risks of leaving against medical advice. Pt was receiving IV abx for cellulitis. Many attempts have been made to reason with pt to remain in hospital however pt refusing to cooperate and wants to leave AMA. Pt aware of consequences of leaving against medical advice including harm, injury, loss of function, sepsis or death. Pt fully understands, all question have been answered. Explained to pt the importance of Following up with the following physicians Dr. Emanuel [facility]. Pt agrees to comply. States she will admit herself to another hospital for further treatment. Antibitics discussed with Dr. Emanuel, PO Augmentin and Bactrim sent to pharmacy. Pt signed AMA form, witnessed by RN at 21:55. D/w Dr. Jurado.

## 2018-04-23 NOTE — BEHAVIORAL HEALTH ASSESSMENT NOTE - SUMMARY
Briefly the patient is a 27 year old woman, single, unemployed, likely homeless, has no pertinent medical issues, has a chronic psychiatric history significant for Opioid dependence, Cocaine abuse, possible substance induced mood disorder, no known history of inpatient psychiatric admissions, denies any history of si or hi, unknown legal history, presented to the ed with swelling+ redness of her hand. Admitted for cellulitis.   Based on evaluation done today, patient is clearly experiencing symptoms of opioid withdrawal and is in distress. No statements of si or hi. She is able to appreciate that this is w/d symptoms, and is asking for help. Denies any other drug abuse, but it has to be suspected. Discussed Methadone taper and patient agrees.     Patient does not require an inpatient psychiatric admission, but she would benefit from inpatient vs outpatient substance abuse treatment- if patient in agreement.

## 2018-04-23 NOTE — PROGRESS NOTE ADULT - PROBLEM SELECTOR PLAN 2
cellulitis and abcess of r-arm, doesn't seem drainable yet.  as above. X-ray negative for acute OM. Keep left hand elevated.   CT non-contrast left hand ordered to r/o drainable collection. Has metal   in neck so cannot do MRI. cellulitis and abcess of r-arm, doesn't seem drainable yet.  as above. X-ray negative for acute OM. Keep left hand elevated.   CT non-contrast left hand no abscess, collection or obvious bone involvement. Has metal in neck so cannot do MRI.

## 2018-04-23 NOTE — PROGRESS NOTE ADULT - PROBLEM SELECTOR PLAN 3
encouraged pt for cessation and went over dangers, including death encouraged pt for cessation and went over dangers, including death.  Psych consult called today for signs of withdrawal. Ativan 1 mg ivp prn, IVF for   nausea and Reglan 10 mg IVSS for nausea.

## 2018-04-23 NOTE — PROGRESS NOTE ADULT - ASSESSMENT
26 y/o w/ivdu w/cellulitis of of left hand. 28 y/o w/ivdu w/cellulitis of of left hand likely from IVDA.

## 2018-04-23 NOTE — CONSULT NOTE ADULT - ASSESSMENT
27 yr old female with hand abscess, SSTI   cont vanco and zosyn to see response   vanco level  if not improving may need I and D 27 yr old iv drug user female with    1. hand abscess, SSTI   cont vanco and zosyn to see response   vanco level  if not improving may need I and D .  blood c/s neg so far

## 2018-04-23 NOTE — BEHAVIORAL HEALTH ASSESSMENT NOTE - OTHER PAST PSYCHIATRIC HISTORY (INCLUDE DETAILS REGARDING ONSET, COURSE OF ILLNESS, INPATIENT/OUTPATIENT TREATMENT)
has a chronic psychiatric history significant for Opioid dependence, Cocaine abuse, possible substance induced mood disorder, no known history of inpatient psychiatric admissions, denies any history of si or hi, unknown legal history

## 2018-04-23 NOTE — PROGRESS NOTE ADULT - SUBJECTIVE AND OBJECTIVE BOX
INTERVAL HPI/OVERNIGHT EVENTS:  Pt seen and examined at bedside.     Allergies/Intolerance: No Known Allergies      MEDICATIONS  (STANDING):  ketorolac   Injectable 15 milliGRAM(s) IV Push every 6 hours  piperacillin/tazobactam IVPB. 3.375 Gram(s) IV Intermittent every 8 hours  vancomycin  IVPB 1000 milliGRAM(s) IV Intermittent every 8 hours    MEDICATIONS  (PRN):  morphine  - Injectable 2 milliGRAM(s) IV Push every 4 hours PRN Mild Pain (1 - 3)        ROS: all systems reviewed and wnl      PHYSICAL EXAMINATION:  Vital Signs Last 24 Hrs  T(C): 36.6 (23 Apr 2018 05:25), Max: 36.8 (22 Apr 2018 17:53)  T(F): 97.9 (23 Apr 2018 05:25), Max: 98.3 (22 Apr 2018 17:53)  HR: 64 (23 Apr 2018 05:25) (61 - 66)  BP: 114/72 (23 Apr 2018 05:25) (111/69 - 119/65)  BP(mean): --  RR: 18 (23 Apr 2018 05:25) (18 - 19)  SpO2: 100% (23 Apr 2018 05:25) (91% - 100%)  CAPILLARY BLOOD GLUCOSE          04-22 @ 07:01 - 04-23 @ 07:00  --------------------------------------------------------  IN: 100 mL / OUT: 0 mL / NET: 100 mL    04-23 @ 07:01 - 04-23 @ 12:35  --------------------------------------------------------  IN: 0 mL / OUT: 300 mL / NET: -300 mL        GENERAL:   NECK: supple, No JVD  CHEST/LUNG: clear to auscultation bilaterally; no rales, rhonchi, or wheezing b/l  HEART: normal S1, S2  ABDOMEN: BS+, soft, ND, NT   EXTREMITIES:  pulses palpable; no clubbing, cyanosis, or edema b/l LEs  SKIN: no rashes or lesions      LABS:                        11.3   9.86  )-----------( 304      ( 22 Apr 2018 06:56 )             35.4     04-22    141  |  107  |  11  ----------------------------<  143<H>  3.3<L>   |  27  |  0.72    Ca    8.2<L>      22 Apr 2018 06:56    TPro  7.2  /  Alb  3.1<L>  /  TBili  0.7  /  DBili  x   /  AST  11<L>  /  ALT  15  /  AlkPhos  60  04-21 INTERVAL HPI/OVERNIGHT EVENTS:  Pt seen and examined at bedside.     Allergies/Intolerance: No Known Allergies      MEDICATIONS  (STANDING):  ketorolac   Injectable 15 milliGRAM(s) IV Push every 6 hours  piperacillin/tazobactam IVPB. 3.375 Gram(s) IV Intermittent every 8 hours  vancomycin  IVPB 1000 milliGRAM(s) IV Intermittent every 8 hours    MEDICATIONS  (PRN):  morphine  - Injectable 2 milliGRAM(s) IV Push every 4 hours PRN Mild Pain (1 - 3)        ROS: all systems reviewed and wnl      PHYSICAL EXAMINATION:  Vital Signs Last 24 Hrs  T(C): 36.6 (23 Apr 2018 05:25), Max: 36.8 (22 Apr 2018 17:53)  T(F): 97.9 (23 Apr 2018 05:25), Max: 98.3 (22 Apr 2018 17:53)  HR: 64 (23 Apr 2018 05:25) (61 - 66)  BP: 114/72 (23 Apr 2018 05:25) (111/69 - 119/65)  BP(mean): --  RR: 18 (23 Apr 2018 05:25) (18 - 19)  SpO2: 100% (23 Apr 2018 05:25) (91% - 100%)  CAPILLARY BLOOD GLUCOSE          04-22 @ 07:01 - 04-23 @ 07:00  --------------------------------------------------------  IN: 100 mL / OUT: 0 mL / NET: 100 mL    04-23 @ 07:01 - 04-23 @ 12:35  --------------------------------------------------------  IN: 0 mL / OUT: 300 mL / NET: -300 mL        GENERAL: more agitated today, no fevers, no diarrhea, less swelling left hand  NECK: supple, No JVD  CHEST/LUNG: clear to auscultation bilaterally; no rales, rhonchi, or wheezing b/l  HEART: normal S1, S2  ABDOMEN: BS+, soft, ND, NT   EXTREMITIES:  50 percent left swelling and redness on left hand  SKIN: no rashes or lesions      LABS:                        11.3   9.86  )-----------( 304      ( 22 Apr 2018 06:56 )             35.4     04-22    141  |  107  |  11  ----------------------------<  143<H>  3.3<L>   |  27  |  0.72    Ca    8.2<L>      22 Apr 2018 06:56    TPro  7.2  /  Alb  3.1<L>  /  TBili  0.7  /  DBili  x   /  AST  11<L>  /  ALT  15  /  AlkPhos  60  04-21

## 2018-04-24 DIAGNOSIS — F11.23 OPIOID DEPENDENCE WITH WITHDRAWAL: ICD-10-CM

## 2018-04-24 DIAGNOSIS — F19.94 OTHER PSYCHOACTIVE SUBSTANCE USE, UNSPECIFIED WITH PSYCHOACTIVE SUBSTANCE-INDUCED MOOD DISORDER: ICD-10-CM

## 2018-04-27 LAB
CULTURE RESULTS: SIGNIFICANT CHANGE UP
CULTURE RESULTS: SIGNIFICANT CHANGE UP
SPECIMEN SOURCE: SIGNIFICANT CHANGE UP
SPECIMEN SOURCE: SIGNIFICANT CHANGE UP

## 2018-05-07 DIAGNOSIS — F17.210 NICOTINE DEPENDENCE, CIGARETTES, UNCOMPLICATED: ICD-10-CM

## 2018-05-07 DIAGNOSIS — L03.114 CELLULITIS OF LEFT UPPER LIMB: ICD-10-CM

## 2018-05-07 DIAGNOSIS — R01.0 BENIGN AND INNOCENT CARDIAC MURMURS: ICD-10-CM

## 2018-05-07 DIAGNOSIS — F11.23 OPIOID DEPENDENCE WITH WITHDRAWAL: ICD-10-CM

## 2018-05-07 DIAGNOSIS — F14.19 COCAINE ABUSE WITH UNSPECIFIED COCAINE-INDUCED DISORDER: ICD-10-CM

## 2018-05-07 DIAGNOSIS — L02.413 CUTANEOUS ABSCESS OF RIGHT UPPER LIMB: ICD-10-CM

## 2018-05-07 DIAGNOSIS — F41.9 ANXIETY DISORDER, UNSPECIFIED: ICD-10-CM

## 2018-05-07 DIAGNOSIS — M79.89 OTHER SPECIFIED SOFT TISSUE DISORDERS: ICD-10-CM

## 2018-05-07 DIAGNOSIS — Z59.0 HOMELESSNESS: ICD-10-CM

## 2018-05-07 DIAGNOSIS — L03.113 CELLULITIS OF RIGHT UPPER LIMB: ICD-10-CM

## 2018-05-07 SDOH — ECONOMIC STABILITY - HOUSING INSECURITY: HOMELESSNESS: Z59.0

## 2018-08-01 ENCOUNTER — OUTPATIENT (OUTPATIENT)
Dept: OUTPATIENT SERVICES | Facility: HOSPITAL | Age: 28
LOS: 1 days | End: 2018-08-01
Payer: MEDICAID

## 2018-08-01 PROCEDURE — G9001: CPT

## 2018-08-16 DIAGNOSIS — Z71.89 OTHER SPECIFIED COUNSELING: ICD-10-CM

## 2018-08-17 PROBLEM — F19.10 OTHER PSYCHOACTIVE SUBSTANCE ABUSE, UNCOMPLICATED: Chronic | Status: ACTIVE | Noted: 2018-04-21

## 2018-12-20 ENCOUNTER — EMERGENCY (EMERGENCY)
Facility: HOSPITAL | Age: 28
LOS: 1 days | End: 2018-12-20
Payer: OTHER GOVERNMENT

## 2018-12-20 PROCEDURE — 99284 EMERGENCY DEPT VISIT MOD MDM: CPT

## 2018-12-21 PROCEDURE — 74177 CT ABD & PELVIS W/CONTRAST: CPT | Mod: 26

## 2019-03-06 NOTE — BEHAVIORAL HEALTH ASSESSMENT NOTE - NSBHCONSULTFOLLOWAFTERCARE_PSY_A_CORE FT
Detail Level: Generalized Detail Level: Zone Patient does not require an inpatient psychiatric admission, but she would benefit from inpatient vs outpatient substance abuse treatment- if patient in agreement.

## 2023-04-17 NOTE — BEHAVIORAL HEALTH ASSESSMENT NOTE - MARITAL STATUS
Continued Stay SW/CM Assessment/Plan of Care Note         Progress note:  Monday     Following for [facility] placement.  Aware pt has been clinically accepted to [facility] however pending T-19 bed availability.  Voicemail left with [facility] liason this AM and will await return call    ADD 1015 Spoke with Lauren with [facility].  No Medicaid bed.  Will re-check later this week    See SW/CM flowsheets for other objective data.    Disposition Recommendations:  Preliminary discharge destination:    SW/CM recommendation for discharge: Home care    Discharge Plan/Needs:     Continued Care and Services - Admitted Since 3/2/2023     Destination      Service Provider Request Status Selected Services Address Phone Fax    [facility] Pending - Request Sent N/A [id number] [address] [phone]            Continued Care and Services - Linked Episodes Includes continued care and service providers from the active episodes linked to this encounter, which are listed below    Care Transitions Episode start date: 3/16/2023   There are no active outsourced providers for this episode.                  Single